# Patient Record
Sex: FEMALE | Race: WHITE
[De-identification: names, ages, dates, MRNs, and addresses within clinical notes are randomized per-mention and may not be internally consistent; named-entity substitution may affect disease eponyms.]

---

## 2018-10-30 ENCOUNTER — HOSPITAL ENCOUNTER (OUTPATIENT)
Dept: HOSPITAL 89 - MAMO | Age: 33
End: 2018-10-30
Attending: PHYSICIAN ASSISTANT
Payer: COMMERCIAL

## 2018-10-30 VITALS — BODY MASS INDEX: 46.83 KG/M2

## 2018-10-30 DIAGNOSIS — Z12.31: Primary | ICD-10-CM

## 2018-10-30 DIAGNOSIS — R92.8: ICD-10-CM

## 2018-10-30 PROCEDURE — 77063 BREAST TOMOSYNTHESIS BI: CPT

## 2018-10-30 PROCEDURE — 77067 SCR MAMMO BI INCL CAD: CPT

## 2018-10-31 NOTE — RADIOLOGY IMAGING REPORT
FACILITY: [facility] 

 

PATIENT NAME: ELIZABETH COKER

: [id number]

MR: [id number]

V: [id number]

EXAM DATE: [id number]

ORDERING PHYSICIAN: ADELINE DIAMOND

TECHNOLOGIST: Lola Madden

 

PROCEDURE:BILATERAL DIGITAL SCREENING MAMMOGRAM

 

COMPARISON:None.

 

INDICATIONS:BASELINE

 

FINDINGS:  

A small amount of fibroglandular tissue is seen throughout the breasts. 

In the lateral portion of the Right breast posterior 1/3 there is a 

focal area of increased density for which Spot compression view is 

recommended.

 

DIAGNOSTIC CATEGORY 0--INCOMPLETE: NEED ADDITIONAL IMAGING EVALUATION.  

 

 

RECOMMENDATIONS:

ADDITIONAL MAMMOGRAPHIC VIEWS REQUIRED: RIGHT BREAST.    

 

IMPRESSION:

BIRADS 0: Incomplete. 

Additional views of the Right breast recommended as described.

 

 

 

 

 

 

 

 

Dictated by:  Odalis Valle M.D. on 10/30/2018 at 16:27   

Transcribed by: FIX on 10/31/2018 at 7:43    

Approved by:  Odalis Valle M.D. on 10/31/2018 at 8:17   

[facility]

## 2018-11-16 ENCOUNTER — HOSPITAL ENCOUNTER (OUTPATIENT)
Dept: HOSPITAL 89 - MAMO | Age: 33
End: 2018-11-16
Attending: PHYSICIAN ASSISTANT
Payer: COMMERCIAL

## 2018-11-16 VITALS — BODY MASS INDEX: 46.83 KG/M2

## 2018-11-16 DIAGNOSIS — R92.8: Primary | ICD-10-CM

## 2018-11-16 PROCEDURE — 77065 DX MAMMO INCL CAD UNI: CPT

## 2018-11-16 PROCEDURE — 77061 BREAST TOMOSYNTHESIS UNI: CPT

## 2018-11-16 NOTE — RADIOLOGY IMAGING REPORT
FACILITY: [facility] 

 

PATIENT NAME: ELIZABETH COKER

: [id number]

MR: [id number]

V: [id number]

EXAM DATE: [id number]

ORDERING PHYSICIAN: BEN LOPEZ

TECHNOLOGIST: Stefani Gomes

 

PROCEDURE:RIGHT DIGITAL DIAGNOSTIC MAMMOGRAM WITH CAD ASSISTED 

INTERPRETATION & 3D TOMOSYNTHESIS 

 

COMPARISON:Prior mammogram 10/30/2018. 

 

INDICATIONS:Right lateral breast asymmetry on CC view of recent 

screening mammogram.

 

TISSUE DENSITY:  Predominantly fatty tissue.

 

FINDINGS:  

The right lateral breast asymmetry presses out with focal compression. 

No underlying mass or architectural distortion is seen. 

 

DIAGNOSTIC CATEGORY 1--NEGATIVE.  

 

RECOMMENDATIONS:

ANNUAL BILATERAL SCREENING MAMMOGRAM AND CLINICAL EVALUATION.   

 

IMPRESSION: 

BIRADS 1: Negative.

 

 

 

 

 

 

 

 

 

Dictated by:  Sybil Bush M.D. on 2018 at 14:45   

Transcribed by: FIX on 2018 at 15:05    

Approved by:  Sybil Bush M.D. on 2018 at 15:32   

[facility]